# Patient Record
Sex: MALE | Race: OTHER | URBAN - METROPOLITAN AREA
[De-identification: names, ages, dates, MRNs, and addresses within clinical notes are randomized per-mention and may not be internally consistent; named-entity substitution may affect disease eponyms.]

---

## 2020-02-28 ENCOUNTER — IMPORTED ENCOUNTER (OUTPATIENT)
Dept: URBAN - METROPOLITAN AREA CLINIC 50 | Facility: CLINIC | Age: 60
End: 2020-02-28

## 2021-04-17 ASSESSMENT — TONOMETRY
OS_IOP_MMHG: 16
OD_IOP_MMHG: 15

## 2021-04-17 ASSESSMENT — VISUAL ACUITY
OS_OTHER: 20/50. 20/70.
OD_OTHER: 20/50. 20/100.
OS_BAT: 20/50
OS_CC: J1+@ 18 IN
OS_CC: 20/30
OD_CC: J1+@ 18 IN
OD_CC: 20/20-2
OD_BAT: 20/50

## 2022-01-17 ENCOUNTER — PREPPED CHART (OUTPATIENT)
Dept: URBAN - METROPOLITAN AREA CLINIC 50 | Facility: CLINIC | Age: 62
End: 2022-01-17

## 2022-01-27 ENCOUNTER — COMPREHENSIVE EXAM (OUTPATIENT)
Dept: URBAN - METROPOLITAN AREA CLINIC 50 | Facility: CLINIC | Age: 62
End: 2022-01-27

## 2022-01-27 DIAGNOSIS — H16.223: ICD-10-CM

## 2022-01-27 DIAGNOSIS — Z01.01: ICD-10-CM

## 2022-01-27 DIAGNOSIS — H25.13: ICD-10-CM

## 2022-01-27 DIAGNOSIS — H43.813: ICD-10-CM

## 2022-01-27 PROCEDURE — 92015 DETERMINE REFRACTIVE STATE: CPT

## 2022-01-27 PROCEDURE — 92014 COMPRE OPH EXAM EST PT 1/>: CPT

## 2022-01-27 ASSESSMENT — VISUAL ACUITY
OD_GLARE: 20/50
OS_GLARE: 20/40
OS_GLARE: 20/50
OU_CC: 20/25
OU_CC: J1+@ 14 IN
OD_GLARE: 20/60
OS_CC: 20/30
OS_SC: 20/25
OU_SC: 20/25
OD_CC: 20/30+2
OD_SC: 20/25

## 2022-01-27 ASSESSMENT — KERATOMETRY
OD_K1POWER_DIOPTERS: 43.00
OD_AXISANGLE2_DEGREES: 112
OS_AXISANGLE2_DEGREES: 63
OS_K2POWER_DIOPTERS: 44.25
OS_K1POWER_DIOPTERS: 43.50
OD_K2POWER_DIOPTERS: 43.75
OD_AXISANGLE_DEGREES: 022
OS_AXISANGLE_DEGREES: 153

## 2022-01-27 ASSESSMENT — TONOMETRY
OS_IOP_MMHG: 14
OD_IOP_MMHG: 15

## 2022-01-27 NOTE — PATIENT DISCUSSION
Drop regiment discussed with patient. Start PF tears OU 2-3x/day or as needed. Start warm compresses OU BID. Start lid scubs OU BID.